# Patient Record
Sex: MALE | Race: WHITE | NOT HISPANIC OR LATINO | Employment: FULL TIME | URBAN - NONMETROPOLITAN AREA
[De-identification: names, ages, dates, MRNs, and addresses within clinical notes are randomized per-mention and may not be internally consistent; named-entity substitution may affect disease eponyms.]

---

## 2023-04-24 NOTE — PROGRESS NOTES
Subjective:    Drake Morrison is a 27 y.o. male who presents for No chief complaint on file.        HPI:    What concern/ problem/pain/symptom  brings you here today?      how long has pt had sxs?      describe symptoms-      how often do symptoms occur?      has pt tried anything for current symptoms, including medications (OTC or prescription)  ?        what makes symptoms worse?      has pt been seen recently for this problem ( within past 2-3 weeks) ?    if yes- where?    by who?    what treatment was provided?                Social History     Tobacco Use   Smoking Status Not on file   Smokeless Tobacco Not on file         Review of Systems:      Objective:    There were no vitals filed for this visit.      Pt is A and O x3, NAD, nontoxic, well-hydrated   Head- normocephalic and atraumatic,   EYES- conjunctiva- normal   lids- normal  EARS/NOSE- normal external exam   CV- RRR without murmur  PULM- CTA bilaterally, normal respiratory effort  RESPIRATORY EFFORT- normal , no retractions or nasal flaring   ABD- normoactive BS's , soft, no HSM, no CVAT, NT   Inguinal area-   EXT- no edema,NT  SKIN- no abnormal skin lesions noted  NEURO- no focal deficits  PSYCH- pleasant, normal judgement and insight                  Assessment/Plan:    No diagnosis found.    No orders of the defined types were placed in this encounter.                Call if no better or if symptoms worsen

## 2023-04-25 ENCOUNTER — OFFICE VISIT (OUTPATIENT)
Dept: PRIMARY CARE | Facility: CLINIC | Age: 28
End: 2023-04-25
Payer: COMMERCIAL

## 2023-04-25 VITALS
HEART RATE: 78 BPM | TEMPERATURE: 98.3 F | BODY MASS INDEX: 25.52 KG/M2 | WEIGHT: 188.4 LBS | HEIGHT: 72 IN | DIASTOLIC BLOOD PRESSURE: 75 MMHG | OXYGEN SATURATION: 97 % | SYSTOLIC BLOOD PRESSURE: 114 MMHG | RESPIRATION RATE: 14 BRPM

## 2023-04-25 DIAGNOSIS — K40.91 UNILATERAL RECURRENT INGUINAL HERNIA WITHOUT OBSTRUCTION OR GANGRENE: Primary | ICD-10-CM

## 2023-04-25 PROCEDURE — 1036F TOBACCO NON-USER: CPT | Performed by: FAMILY MEDICINE

## 2023-04-25 PROCEDURE — 99203 OFFICE O/P NEW LOW 30 MIN: CPT | Performed by: FAMILY MEDICINE

## 2023-04-25 RX ORDER — AMOXICILLIN 500 MG/1
TABLET, FILM COATED ORAL
COMMUNITY
Start: 2023-04-18

## 2023-04-25 NOTE — PROGRESS NOTES
Subjective:    Prince Juan is a 27 y.o. male who presents for   Chief Complaint   Patient presents with    Hernia       Pt is new pateint to the office today     HPI:    What concern/ problem/pain/symptom  brings you here today?    Poss hernia     how long has pt had sxs?    Since yesterday    describe symptoms-    Mild pain, groin pain, feels a lump- on his right side       Noticed this several days ago      No vomiting or abdominal pain    No fever    Hx hernia child - right     how often do symptoms occur?    On and off    has pt tried anything for current symptoms, including medications (OTC or prescription)  ?      no    what makes symptoms worse?    N/a    has pt been seen recently for this problem ( within past 2-3 weeks) ?  No    if yes- where?  N/a    by who?  N/a    what treatment was provided?    N/a    On amoxicillin for dental infection    Social History     Tobacco Use   Smoking Status Former    Types: Cigarettes    Quit date: 10/2022    Years since quittin.5   Smokeless Tobacco Never   Vaping Use   Vaping Status Some Days         Review of Systems:      Objective:    Vitals:    23 0707   BP: 114/75   BP Location: Right arm   Patient Position: Sitting   BP Cuff Size: Large adult   Pulse: 78   Resp: 14   Temp: 36.8 °C (98.3 °F)   TempSrc: Temporal   SpO2: 97%   Weight: 85.5 kg (188 lb 6.4 oz)   Height: 1.829 m (6')         Pt is A and O x3, NAD, nontoxic, well-hydrated   ABD- normoactive BS's , soft, no HSM, no CVAT, NT   Inguinal area-right-2-3 cm area- fullness- not red or bulging, not warm, area reducible , NT- one small 1 cm smooth mobile NT node also palpated   SKIN- no abnormal skin lesions noted              Assessment/Plan:    1. Unilateral recurrent inguinal hernia without obstruction or gangrene  Referral to General Surgery          Orders Placed This Encounter   Procedures    Referral to General Surgery                 Call if no better or if symptoms worsen